# Patient Record
Sex: MALE | Race: WHITE | URBAN - METROPOLITAN AREA
[De-identification: names, ages, dates, MRNs, and addresses within clinical notes are randomized per-mention and may not be internally consistent; named-entity substitution may affect disease eponyms.]

---

## 2017-02-22 ENCOUNTER — HISTORICAL (OUTPATIENT)
Dept: LAB | Facility: HOSPITAL | Age: 72
End: 2017-02-22

## 2017-03-14 ENCOUNTER — HISTORICAL (OUTPATIENT)
Dept: CARDIOLOGY | Facility: HOSPITAL | Age: 72
End: 2017-03-14

## 2017-07-05 ENCOUNTER — HISTORICAL (OUTPATIENT)
Dept: LAB | Facility: HOSPITAL | Age: 72
End: 2017-07-05

## 2017-07-05 LAB
ABS NEUT (OLG): 3.5 X10(3)/MCL
APTT PPP: 25 SECOND(S) (ref 21–30)
BUN SERPL-MCNC: 20 MG/DL (ref 7–18)
CALCIUM SERPL-MCNC: 8.7 MG/DL (ref 8.5–10.1)
CHLORIDE SERPL-SCNC: 104 MMOL/L (ref 98–107)
CO2 SERPL-SCNC: 31 MMOL/L (ref 21–32)
CREAT SERPL-MCNC: 0.95 MG/DL (ref 0.7–1.3)
ERYTHROCYTE [DISTWIDTH] IN BLOOD BY AUTOMATED COUNT: 14 % (ref 11.5–14.8)
GLUCOSE SERPL-MCNC: 88 MG/DL (ref 74–106)
HCT VFR BLD AUTO: 42.8 % (ref 36.2–49.3)
HGB BLD-MCNC: 14.1 GM/DL (ref 12.6–17.3)
INR PPP: 1
MCH RBC QN AUTO: 30.2 PG (ref 28.5–33.8)
MCHC RBC AUTO-ENTMCNC: 32.9 % (ref 32.6–37)
MCV RBC AUTO: 91.6 FL (ref 80–99)
PLATELET # BLD AUTO: 173 X10(3)/MCL (ref 136–369)
PMV BLD AUTO: 8.7 FL (ref 7.4–10.4)
POTASSIUM SERPL-SCNC: 4 MMOL/L (ref 3.5–5.1)
PROTHROMBIN TIME: 10.8 SECOND(S) (ref 9.8–12)
RBC # BLD AUTO: 4.67 X10(6)/MCL (ref 4.19–5.75)
SODIUM SERPL-SCNC: 140 MMOL/L (ref 136–145)
WBC # SPEC AUTO: 6.6 X10(3)/MCL (ref 4–10.5)

## 2017-07-06 ENCOUNTER — HISTORICAL (OUTPATIENT)
Dept: ADMINISTRATIVE | Facility: HOSPITAL | Age: 72
End: 2017-07-06

## 2018-07-08 ENCOUNTER — HISTORICAL (OUTPATIENT)
Dept: LAB | Facility: HOSPITAL | Age: 73
End: 2018-07-08

## 2018-07-08 LAB
ABS NEUT (OLG): 2.44 X10(3)/MCL (ref 2.1–9.2)
ALBUMIN SERPL-MCNC: 3.5 GM/DL (ref 3.4–5)
ALBUMIN/GLOB SERPL: 1 {RATIO}
ALP SERPL-CCNC: 65 UNIT/L (ref 45–117)
ALT SERPL-CCNC: 22 UNIT/L (ref 16–61)
AST SERPL-CCNC: 16 UNIT/L (ref 15–37)
BASOPHILS # BLD AUTO: 0.06 X10(3)/MCL (ref 0–0.2)
BASOPHILS NFR BLD AUTO: 1.3 % (ref 0–0.9)
BILIRUB SERPL-MCNC: 0.9 MG/DL (ref 0.2–1)
BILIRUBIN DIRECT+TOT PNL SERPL-MCNC: 0.2 MG/DL (ref 0–0.2)
BILIRUBIN DIRECT+TOT PNL SERPL-MCNC: 0.7 MG/DL (ref 0–1)
BUN SERPL-MCNC: 16 MG/DL (ref 7–18)
CALCIUM SERPL-MCNC: 8.5 MG/DL (ref 8.5–10.1)
CHLORIDE SERPL-SCNC: 110 MMOL/L (ref 98–107)
CHOLEST SERPL-MCNC: 164 MG/DL (ref 0–200)
CHOLEST/HDLC SERPL: 3.2 {RATIO} (ref 0–5)
CO2 SERPL-SCNC: 27 MMOL/L (ref 21–32)
CREAT SERPL-MCNC: 1.04 MG/DL (ref 0.7–1.3)
EOSINOPHIL # BLD AUTO: 0.32 X10(3)/MCL (ref 0–0.9)
EOSINOPHIL NFR BLD AUTO: 7.1 % (ref 0–6.5)
ERYTHROCYTE [DISTWIDTH] IN BLOOD BY AUTOMATED COUNT: 14 % (ref 11.5–17)
GLOBULIN SER-MCNC: 3 GM/DL (ref 2–4)
GLUCOSE SERPL-MCNC: 101 MG/DL (ref 74–106)
HCT VFR BLD AUTO: 36.3 % (ref 42–52)
HDLC SERPL-MCNC: 51 MG/DL (ref 35–60)
HGB BLD-MCNC: 11.9 GM/DL (ref 14–18)
IMM GRANULOCYTES # BLD AUTO: 0.01 10*3/UL (ref 0–0.02)
IMM GRANULOCYTES NFR BLD AUTO: 0.2 % (ref 0–0.43)
LDLC SERPL CALC-MCNC: 89 MG/DL (ref 0–129)
LYMPHOCYTES # BLD AUTO: 1.32 X10(3)/MCL (ref 0.6–4.6)
LYMPHOCYTES NFR BLD AUTO: 29.1 % (ref 16.2–38.3)
MAGNESIUM SERPL-MCNC: 2.3 MG/DL (ref 1.8–2.4)
MCH RBC QN AUTO: 28.4 PG (ref 27–31)
MCHC RBC AUTO-ENTMCNC: 32.8 GM/DL (ref 33–36)
MCV RBC AUTO: 86.6 FL (ref 80–94)
MONOCYTES # BLD AUTO: 0.38 X10(3)/MCL (ref 0.1–1.3)
MONOCYTES NFR BLD AUTO: 8.4 % (ref 4.7–11.3)
NEUTROPHILS # BLD AUTO: 2.44 X10(3)/MCL (ref 2.1–9.2)
NEUTROPHILS NFR BLD AUTO: 53.9 % (ref 49.1–73.4)
NRBC BLD AUTO-RTO: 0 % (ref 0–0.2)
PLATELET # BLD AUTO: 137 X10(3)/MCL (ref 130–400)
PMV BLD AUTO: 9 FL (ref 7.4–10.4)
POTASSIUM SERPL-SCNC: 4.1 MMOL/L (ref 3.5–5.1)
PROT SERPL-MCNC: 6.6 GM/DL (ref 6.4–8.2)
RBC # BLD AUTO: 4.19 X10(6)/MCL (ref 4.7–6.1)
SODIUM SERPL-SCNC: 141 MMOL/L (ref 136–145)
TRIGL SERPL-MCNC: 119 MG/DL (ref 30–150)
VLDLC SERPL CALC-MCNC: 24 MG/DL
WBC # SPEC AUTO: 4.5 X10(3)/MCL (ref 4.5–11.5)

## 2019-02-20 ENCOUNTER — HISTORICAL (OUTPATIENT)
Dept: LAB | Facility: HOSPITAL | Age: 74
End: 2019-02-20

## 2019-02-20 LAB
ABS NEUT (OLG): 2.29 X10(3)/MCL (ref 2.1–9.2)
BASOPHILS # BLD AUTO: 0.05 X10(3)/MCL (ref 0–0.2)
BASOPHILS NFR BLD AUTO: 1.1 % (ref 0–0.9)
BUN SERPL-MCNC: 13 MG/DL (ref 7–18)
CALCIUM SERPL-MCNC: 8.8 MG/DL (ref 8.5–10.1)
CHLORIDE SERPL-SCNC: 106 MMOL/L (ref 98–107)
CO2 SERPL-SCNC: 27 MMOL/L (ref 21–32)
CREAT SERPL-MCNC: 1.13 MG/DL (ref 0.7–1.3)
CREAT/UREA NIT SERPL: 12
EOSINOPHIL # BLD AUTO: 0.32 X10(3)/MCL (ref 0–0.9)
EOSINOPHIL NFR BLD AUTO: 7.1 % (ref 0–6.5)
ERYTHROCYTE [DISTWIDTH] IN BLOOD BY AUTOMATED COUNT: 14.4 % (ref 11.5–17)
GLUCOSE SERPL-MCNC: 89 MG/DL (ref 74–106)
HCT VFR BLD AUTO: 38.2 % (ref 42–52)
HGB BLD-MCNC: 12.3 GM/DL (ref 14–18)
IMM GRANULOCYTES # BLD AUTO: 0.01 10*3/UL (ref 0–0.02)
IMM GRANULOCYTES NFR BLD AUTO: 0.2 % (ref 0–0.43)
LYMPHOCYTES # BLD AUTO: 1.3 X10(3)/MCL (ref 0.6–4.6)
LYMPHOCYTES NFR BLD AUTO: 29 % (ref 16.2–38.3)
MAGNESIUM SERPL-MCNC: 2.2 MG/DL (ref 1.8–2.4)
MCH RBC QN AUTO: 28.1 PG (ref 27–31)
MCHC RBC AUTO-ENTMCNC: 32.2 GM/DL (ref 33–36)
MCV RBC AUTO: 87.4 FL (ref 80–94)
MONOCYTES # BLD AUTO: 0.51 X10(3)/MCL (ref 0.1–1.3)
MONOCYTES NFR BLD AUTO: 11.4 % (ref 4.7–11.3)
NEUTROPHILS # BLD AUTO: 2.29 X10(3)/MCL (ref 2.1–9.2)
NEUTROPHILS NFR BLD AUTO: 51.2 % (ref 49.1–73.4)
NRBC BLD AUTO-RTO: 0 % (ref 0–0.2)
PLATELET # BLD AUTO: 134 X10(3)/MCL (ref 130–400)
PMV BLD AUTO: 8.8 FL (ref 7.4–10.4)
POTASSIUM SERPL-SCNC: 4 MMOL/L (ref 3.5–5.1)
RBC # BLD AUTO: 4.37 X10(6)/MCL (ref 4.7–6.1)
SODIUM SERPL-SCNC: 138 MMOL/L (ref 136–145)
WBC # SPEC AUTO: 4.5 X10(3)/MCL (ref 4.5–11.5)

## 2020-03-10 ENCOUNTER — HISTORICAL (OUTPATIENT)
Dept: LAB | Facility: HOSPITAL | Age: 75
End: 2020-03-10

## 2020-03-10 LAB
ABS NEUT (OLG): 3.25 X10(3)/MCL (ref 2.1–9.2)
ALBUMIN SERPL-MCNC: 3.8 GM/DL (ref 3.4–5)
ALBUMIN/GLOB SERPL: 1.3 RATIO (ref 1–2)
ALP SERPL-CCNC: 67 UNIT/L (ref 45–117)
ALT SERPL-CCNC: 18 UNIT/L (ref 16–61)
AST SERPL-CCNC: 45 UNIT/L (ref 15–37)
BASOPHILS # BLD AUTO: 0.03 X10(3)/MCL (ref 0–0.2)
BASOPHILS NFR BLD AUTO: 0.5 % (ref 0–0.9)
BILIRUB SERPL-MCNC: 1.3 MG/DL (ref 0.2–1)
BILIRUBIN DIRECT+TOT PNL SERPL-MCNC: 0.35 MG/DL (ref 0–0.2)
BILIRUBIN DIRECT+TOT PNL SERPL-MCNC: 0.95 MG/DL (ref 0–1)
BUN SERPL-MCNC: 15 MG/DL (ref 7–18)
CALCIUM SERPL-MCNC: 8.9 MG/DL (ref 8.5–10.1)
CHLORIDE SERPL-SCNC: 105 MMOL/L (ref 98–107)
CHOLEST SERPL-MCNC: 133 MG/DL (ref 0–199)
CHOLEST/HDLC SERPL: 2 {RATIO}
CO2 SERPL-SCNC: 30 MMOL/L (ref 21–32)
CREAT SERPL-MCNC: 1.15 MG/DL (ref 0.7–1.3)
EOSINOPHIL # BLD AUTO: 0.3 X10(3)/MCL (ref 0–0.9)
EOSINOPHIL NFR BLD AUTO: 5.3 % (ref 0–6.5)
ERYTHROCYTE [DISTWIDTH] IN BLOOD BY AUTOMATED COUNT: 13.9 % (ref 11.5–17)
GLOBULIN SER-MCNC: 3 GM/DL (ref 2–4)
GLUCOSE SERPL-MCNC: 86 MG/DL (ref 74–106)
HCT VFR BLD AUTO: 37.2 % (ref 42–52)
HDLC SERPL-MCNC: 61 MG/DL
HGB BLD-MCNC: 12 GM/DL (ref 14–18)
IMM GRANULOCYTES # BLD AUTO: 0.01 10*3/UL (ref 0–0.02)
IMM GRANULOCYTES NFR BLD AUTO: 0.2 % (ref 0–0.43)
LDLC SERPL CALC-MCNC: 52 MG/DL (ref 0–129)
LYMPHOCYTES # BLD AUTO: 1.4 X10(3)/MCL (ref 0.6–4.6)
LYMPHOCYTES NFR BLD AUTO: 24.8 % (ref 16.2–38.3)
MAGNESIUM SERPL-MCNC: 2.2 MG/DL (ref 1.8–2.4)
MCH RBC QN AUTO: 29.4 PG (ref 27–31)
MCHC RBC AUTO-ENTMCNC: 32.3 GM/DL (ref 33–36)
MCV RBC AUTO: 91.2 FL (ref 80–94)
MONOCYTES # BLD AUTO: 0.65 X10(3)/MCL (ref 0.1–1.3)
MONOCYTES NFR BLD AUTO: 11.5 % (ref 4.7–11.3)
NEUTROPHILS # BLD AUTO: 3.25 X10(3)/MCL (ref 2.1–9.2)
NEUTROPHILS NFR BLD AUTO: 57.7 % (ref 49.1–73.4)
NRBC BLD AUTO-RTO: 0 % (ref 0–0.2)
PLATELET # BLD AUTO: 140 X10(3)/MCL (ref 130–400)
PMV BLD AUTO: 9.4 FL (ref 7.4–10.4)
POTASSIUM SERPL-SCNC: 4.3 MMOL/L (ref 3.5–5.1)
PROT SERPL-MCNC: 6.5 GM/DL (ref 6.4–8.2)
RBC # BLD AUTO: 4.08 X10(6)/MCL (ref 4.7–6.1)
SODIUM SERPL-SCNC: 140 MMOL/L (ref 136–145)
TRIGL SERPL-MCNC: 101 MG/DL (ref 0–149)
VLDLC SERPL CALC-MCNC: 20 MG/DL
WBC # SPEC AUTO: 5.6 X10(3)/MCL (ref 4.5–11.5)

## 2020-06-23 ENCOUNTER — HISTORICAL (OUTPATIENT)
Dept: LAB | Facility: HOSPITAL | Age: 75
End: 2020-06-23

## 2020-06-23 LAB
ABS NEUT (OLG): 3.38 X10(3)/MCL (ref 2.1–9.2)
BASOPHILS # BLD AUTO: 0.05 X10(3)/MCL (ref 0–0.2)
BASOPHILS NFR BLD AUTO: 0.8 % (ref 0–0.9)
EOSINOPHIL # BLD AUTO: 0.5 X10(3)/MCL (ref 0–0.9)
EOSINOPHIL NFR BLD AUTO: 8.1 % (ref 0–6.5)
ERYTHROCYTE [DISTWIDTH] IN BLOOD BY AUTOMATED COUNT: 15.1 % (ref 11.5–17)
HCT VFR BLD AUTO: 36.8 % (ref 42–52)
HGB BLD-MCNC: 11.5 GM/DL (ref 14–18)
IMM GRANULOCYTES # BLD AUTO: 0.01 10*3/UL (ref 0–0.02)
IMM GRANULOCYTES NFR BLD AUTO: 0.2 % (ref 0–0.43)
LYMPHOCYTES # BLD AUTO: 1.54 X10(3)/MCL (ref 0.6–4.6)
LYMPHOCYTES NFR BLD AUTO: 25 % (ref 16.2–38.3)
MCH RBC QN AUTO: 27 PG (ref 27–31)
MCHC RBC AUTO-ENTMCNC: 31.3 GM/DL (ref 33–36)
MCV RBC AUTO: 86.4 FL (ref 80–94)
MONOCYTES # BLD AUTO: 0.67 X10(3)/MCL (ref 0.1–1.3)
MONOCYTES NFR BLD AUTO: 10.9 % (ref 4.7–11.3)
NEUTROPHILS # BLD AUTO: 3.38 X10(3)/MCL (ref 2.1–9.2)
NEUTROPHILS NFR BLD AUTO: 55 % (ref 49.1–73.4)
NRBC BLD AUTO-RTO: 0 % (ref 0–0.2)
PLATELET # BLD AUTO: 147 X10(3)/MCL (ref 130–400)
PMV BLD AUTO: 8.7 FL (ref 7.4–10.4)
RBC # BLD AUTO: 4.26 X10(6)/MCL (ref 4.7–6.1)
WBC # SPEC AUTO: 6.2 X10(3)/MCL (ref 4.5–11.5)

## 2020-11-12 ENCOUNTER — HISTORICAL (OUTPATIENT)
Dept: ADMINISTRATIVE | Facility: HOSPITAL | Age: 75
End: 2020-11-12

## 2020-11-16 ENCOUNTER — HISTORICAL (OUTPATIENT)
Dept: LAB | Facility: HOSPITAL | Age: 75
End: 2020-11-16

## 2020-11-16 LAB
ABS NEUT (OLG): 3.71 X10(3)/MCL (ref 2.1–9.2)
BASOPHILS # BLD AUTO: 0.04 X10(3)/MCL (ref 0–0.2)
BASOPHILS NFR BLD AUTO: 0.7 % (ref 0–0.9)
BUN SERPL-MCNC: 17 MG/DL (ref 8.4–25.7)
CALCIUM SERPL-MCNC: 9.3 MG/DL (ref 8.8–10)
CHLORIDE SERPL-SCNC: 102 MMOL/L (ref 98–107)
CO2 SERPL-SCNC: 26 MMOL/L (ref 23–31)
CREAT SERPL-MCNC: 1.12 MG/DL (ref 0.72–1.25)
CREAT/UREA NIT SERPL: 15
EOSINOPHIL # BLD AUTO: 0.34 X10(3)/MCL (ref 0–0.9)
EOSINOPHIL NFR BLD AUTO: 5.9 % (ref 0–6.5)
ERYTHROCYTE [DISTWIDTH] IN BLOOD BY AUTOMATED COUNT: 13.1 % (ref 11.5–17)
GLUCOSE SERPL-MCNC: 99 MG/DL (ref 82–115)
HCT VFR BLD AUTO: 38.8 % (ref 42–52)
HGB BLD-MCNC: 13 GM/DL (ref 14–18)
IMM GRANULOCYTES # BLD AUTO: 0.02 10*3/UL (ref 0–0.02)
IMM GRANULOCYTES NFR BLD AUTO: 0.3 % (ref 0–0.43)
LYMPHOCYTES # BLD AUTO: 1.18 X10(3)/MCL (ref 0.6–4.6)
LYMPHOCYTES NFR BLD AUTO: 20.4 % (ref 16.2–38.3)
MAGNESIUM SERPL-MCNC: 2.37 MG/DL (ref 1.6–2.6)
MCH RBC QN AUTO: 30.3 PG (ref 27–31)
MCHC RBC AUTO-ENTMCNC: 33.5 GM/DL (ref 33–36)
MCV RBC AUTO: 90.4 FL (ref 80–94)
MONOCYTES # BLD AUTO: 0.49 X10(3)/MCL (ref 0.1–1.3)
MONOCYTES NFR BLD AUTO: 8.5 % (ref 4.7–11.3)
NEUTROPHILS # BLD AUTO: 3.71 X10(3)/MCL (ref 2.1–9.2)
NEUTROPHILS NFR BLD AUTO: 64.2 % (ref 49.1–73.4)
NRBC BLD AUTO-RTO: 0 % (ref 0–0.2)
PLATELET # BLD AUTO: 194 X10(3)/MCL (ref 130–400)
PMV BLD AUTO: 8.9 FL (ref 7.4–10.4)
POTASSIUM SERPL-SCNC: 4.1 MMOL/L (ref 3.5–5.1)
RBC # BLD AUTO: 4.29 X10(6)/MCL (ref 4.7–6.1)
SODIUM SERPL-SCNC: 136 MMOL/L (ref 136–145)
WBC # SPEC AUTO: 5.8 X10(3)/MCL (ref 4.5–11.5)

## 2020-11-18 ENCOUNTER — HISTORICAL (OUTPATIENT)
Dept: SURGERY | Facility: HOSPITAL | Age: 75
End: 2020-11-18

## 2020-12-03 ENCOUNTER — HISTORICAL (OUTPATIENT)
Dept: ADMINISTRATIVE | Facility: HOSPITAL | Age: 75
End: 2020-12-03

## 2021-01-12 ENCOUNTER — HISTORICAL (OUTPATIENT)
Dept: ADMINISTRATIVE | Facility: HOSPITAL | Age: 76
End: 2021-01-12

## 2021-03-11 ENCOUNTER — HISTORICAL (OUTPATIENT)
Dept: ADMINISTRATIVE | Facility: HOSPITAL | Age: 76
End: 2021-03-11

## 2021-10-20 ENCOUNTER — HISTORICAL (OUTPATIENT)
Dept: LAB | Facility: HOSPITAL | Age: 76
End: 2021-10-20

## 2021-10-20 LAB
ABS NEUT (OLG): 5.46 X10(3)/MCL (ref 2.1–9.2)
ALBUMIN SERPL-MCNC: 3.9 GM/DL (ref 3.4–4.8)
ALBUMIN/GLOB SERPL: 1.4 RATIO (ref 1.1–2)
ALP SERPL-CCNC: 66 UNIT/L (ref 40–150)
ALT SERPL-CCNC: 32 UNIT/L (ref 0–55)
AST SERPL-CCNC: 21 UNIT/L (ref 5–34)
BASOPHILS # BLD AUTO: 0.03 X10(3)/MCL (ref 0–0.2)
BASOPHILS NFR BLD AUTO: 0.4 % (ref 0–0.9)
BILIRUB SERPL-MCNC: 1.5 MG/DL (ref 0.2–1.2)
BILIRUBIN DIRECT+TOT PNL SERPL-MCNC: 0.5 MG/DL (ref 0–0.5)
BILIRUBIN DIRECT+TOT PNL SERPL-MCNC: 1 MG/DL (ref 0–0.8)
BUN SERPL-MCNC: 15.9 MG/DL (ref 8.4–25.7)
CALCIUM SERPL-MCNC: 9.4 MG/DL (ref 8.8–10)
CHLORIDE SERPL-SCNC: 106 MMOL/L (ref 98–107)
CO2 SERPL-SCNC: 27 MMOL/L (ref 23–31)
CREAT SERPL-MCNC: 1.19 MG/DL (ref 0.72–1.25)
EOSINOPHIL # BLD AUTO: 0.15 X10(3)/MCL (ref 0–0.9)
EOSINOPHIL NFR BLD AUTO: 1.8 % (ref 0–6.5)
ERYTHROCYTE [DISTWIDTH] IN BLOOD BY AUTOMATED COUNT: 13.2 % (ref 11.5–17)
GLOBULIN SER-MCNC: 2.7 GM/DL (ref 2.4–3.5)
GLUCOSE SERPL-MCNC: 94 MG/DL (ref 82–115)
HCT VFR BLD AUTO: 41 % (ref 42–52)
HGB BLD-MCNC: 13.3 GM/DL (ref 14–18)
IMM GRANULOCYTES # BLD AUTO: 0.03 10*3/UL (ref 0–0.02)
IMM GRANULOCYTES NFR BLD AUTO: 0.4 % (ref 0–0.43)
LYMPHOCYTES # BLD AUTO: 1.66 X10(3)/MCL (ref 0.6–4.6)
LYMPHOCYTES NFR BLD AUTO: 20.2 % (ref 16.2–38.3)
MAGNESIUM SERPL-MCNC: 2.5 MG/DL (ref 1.6–2.6)
MCH RBC QN AUTO: 29.4 PG (ref 27–31)
MCHC RBC AUTO-ENTMCNC: 32.4 GM/DL (ref 33–36)
MCV RBC AUTO: 90.7 FL (ref 80–94)
MONOCYTES # BLD AUTO: 0.88 X10(3)/MCL (ref 0.1–1.3)
MONOCYTES NFR BLD AUTO: 10.7 % (ref 4.7–11.3)
NEUTROPHILS # BLD AUTO: 5.46 X10(3)/MCL (ref 2.1–9.2)
NEUTROPHILS NFR BLD AUTO: 66.5 % (ref 49.1–73.4)
NRBC BLD AUTO-RTO: 0 % (ref 0–0.2)
PLATELET # BLD AUTO: 171 X10(3)/MCL (ref 130–400)
PMV BLD AUTO: 8.7 FL (ref 7.4–10.4)
POTASSIUM SERPL-SCNC: 4.1 MMOL/L (ref 3.5–5.1)
PROT SERPL-MCNC: 6.6 GM/DL (ref 5.8–7.6)
RBC # BLD AUTO: 4.52 X10(6)/MCL (ref 4.7–6.1)
SODIUM SERPL-SCNC: 141 MMOL/L (ref 136–145)
TESTOST SERPL-MCNC: 436.6 NG/DL (ref 220.91–715.81)
WBC # SPEC AUTO: 8.2 X10(3)/MCL (ref 4.5–11.5)

## 2022-04-10 ENCOUNTER — HISTORICAL (OUTPATIENT)
Dept: ADMINISTRATIVE | Facility: HOSPITAL | Age: 77
End: 2022-04-10

## 2022-04-28 VITALS
BODY MASS INDEX: 23.54 KG/M2 | WEIGHT: 141.31 LBS | HEIGHT: 65 IN | DIASTOLIC BLOOD PRESSURE: 70 MMHG | SYSTOLIC BLOOD PRESSURE: 130 MMHG

## 2022-04-30 NOTE — OP NOTE
DATE OF SURGERY:    07/06/2017    SURGEON:  Kameron Herman MD    INDICATION:  Abnormal stress test.    POSTOPERATIVE DIAGNOSIS:  Nonobstructive coronary artery disease.    PROCEDURES:    1. Right radial angiography.   2. Selective heart cath with left and right coronary angiography.  3. Left ventriculography with hemodynamics.   4. TR band utilizing hemostasis of right radial arteriotomy.    ANGIOGRAPHIC FINDINGS:    1. Right radial demonstrated spasm into distal tip of the sheath.  This resolved with administration of intraarterial Cardene and nitroglycerin.    2. LV - EF 65%.  EDP is 13.  No significant gradient on pullback.   3. Left main is normal.   4. Left circumflex is nondominant with 20% proximal lesion and 20% proximal OM1 lesion.  Remainder of the vessel has luminal irregularities.   5. LAD has 30 to 40% proximal to mid lesion.  The remainder of the vessel has luminal irregularities.   6. RCA - dominant vessel with luminal irregularities.    ESTIMATED BLOOD LOSS:  15 ml.    TISSUE REMOVED:  None applicable.    COMPLICATIONS:  None.    CONSENT:  Informed consent was obtained for the procedure.  Prior to bringing the patient to the Cath Lab, risks, benefits and alternatives were explained in detail including but not limited to stroke, heart attack, death, need for emergent surgery, kidney failure from contrast exposure and bleeding.  The patient voiced understanding and wished to proceed.    HISTORY OF PRESENT ILLNESS:  Please see my H&P for details.    PROCEDURE IN DETAIL:  The patient was taken to the Cardiac Catheterization Laboratory in fasting state and prepped and draped in the usual sterile fashion.  1% lidocaine was used to anesthetize the cutaneous layers of the right radial artery.  #6 Wolof Terumo slender sheath was inserted using modified Seldinger technique.  Secondary to difficulty manipulating the wire, it was decided to proceed with radial angiography, which demonstrated spasm  near distal tip of the sheath.  Intraarterial Cardene and nitroglycerin was administered.  A Glidewire was then advanced into the left ventricular chamber.  Tiger catheter was then tracked into left ventricular chamber.    HEMODYNAMICS:  LV gram pullback gradient was performed.  Heparin was utilized for systemic anticoagulation.  Catheter was redirected in left main and several angiographic views were obtained.  Catheter was redirected in the right coronary artery and several angiographic views were obtained.  The catheter was then removed and a TR band was utilized for hemostasis of right radial arteriotomy.    PLAN:    1. Will continue medical management of the patient's nonobstructive coronary disease.   2. Will discharge home later today.        ______________________________  MD NITHIN Starks/CHANNING  DD:  07/06/2017  Time:  11:31AM  DT:  07/06/2017  Time:  02:29PM  Job #:  [id number]

## 2022-04-30 NOTE — OP NOTE
DATE OF SURGERY:    11/18/2020    SURGEON:  Miguel Zimmer MD  ASSISTANT:  MARIANNA Trinidad    PREOPERATIVE DIAGNOSIS:  Right distal radius fracture.    POSTOPERATIVE DIAGNOSIS:  Right distal radius fracture.    PROCEDURE PERFORMED:  Open reduction and internal fixation of right radius fracture.    ASSISTANT ATTESTATION:  A skilled set of hands was necessary for proper patient positioning, as well as assistance with closure and holding multiple retractors.    IMPLANTS:  Include Biomet right distal radius DVR Crosslock plate with 2.7 cortical screws x4 and 2.7 locking screws x6.    INDICATIONS FOR SURGERY:  The patient is a 75-year-old male who presented to my office.  The patient had fallen and had sustained a right distal radius fracture.  I discussed all treatment options with the patient.  Given the displaced nature of the fracture, we discussed operative and nonoperative interventions.  The risks, benefits, and alternatives to operative intervention were discussed in detail with the patient.  All questions were answered.  No guarantees were made.  Despite these risks, he elected to proceed with surgical intervention.    PROCEDURE IN DETAIL:  The patient was seen preoperatively in the preoperative holding area.  He was marked and consented for surgery.  He was taken to the operating room, where he was placed under general endotracheal anesthesia.  The right wrist was prepped and draped in normal sterile fashion.  A time-out was performed verifying the correct patient, site, side, and procedure, and all were in agreement.  A standard volar approach to the wrist was performed centered on the FCR tendon.  The skin incision was taken directly overlying the FCR tendon sheath.  The FCR tendon sheath was incised, and the tendon was taken ulnarly.  We went down through the inferior profundus tendon, split this, divided this bluntly down to the pronator quadratus.  We made a hockey-stick style incision  along the pronator quadratus as well, elevating this off the bone.  We evaluated the fracture.  We had to stick an elevator in the fracture to get his dorsal tilt back.  Once we were able to get this opened up to get the fracture reduced, we pinned it in position using a K-wire.  We confirmed reduction in AP and lateral views.  We then placed a standard DVR Crosslock plate into position, placed a screw in the distal oblong hole, adjusted it for rotation, adjusted it for appropriate length.  We placed a K-wire in the proximal ulnar wire set, evaluated it, and noted that it was extra-articular.  We placed 1 screw to suck the plate down and then 2 more screws in the proximal row, both locking with appropriate length.  We then tightened the screw in the shaft, again restoring volar tilt.  We then placed the remaining screws in the distal row followed by a screw in the radial styloid.  All screws were appropriate length and on x-ray appeared to be extra-articular in nature.  We then placed 2 more screws in the shaft, again appropriate length.  We then confirmed appropriate position of the plate as well as adequate reduction.  We then dropped the tourniquet and coagulated all bleeders.  We closed the fascia with 2-0 Vicryl.  We used 2-0 Vicryl on subcutaneous tissue and 3-0 nylon on the skin.  We placed the patient in a distal radius volar splint.  He was aroused from anesthesia without any issue and was transferred to recovery in stable condition.  Postoperatively, he will be nonweightbearing and will be discharged home today with followup with me in 2 weeks.        ______________________________  MD TANNER Hu/SK  DD:  11/18/2020  Time:  03:57PM  DT:  11/18/2020  Time:  04:33PM  Job #:  [id number]

## 2022-05-03 NOTE — HISTORICAL [facility] CERNER
This is a historical note converted from Adam. Formatting and pictures may have been removed.  Please reference Adam for original formatting and attached multimedia. Chief Complaint  RIGHT ULNER STYLOID FRACTURE. HE STATES HE FELL AS HE WAS WALKING UP SOME CEMENT STEPS AND FELL BACKWARDS ON 11/05/20. HE WENT TO [facility] ER THEY TOOK SOME X-RASY AND PUT HIM IN A SPLINT. ?HE STATES THE PAIN IS GETTING BETTER.  History of Present Illness  35-year male presents for evaluation of right wrist injury. ?Patient?fell at home. ?Landed on outstretched arm. ?Seen in the ER underwent close reduction of his right distal radius fracture.? He is here today for follow-up of this. ?Complains of isolated points of pain to the right wrist. 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 Complaining of pain to his right wrist but otherwise without complaints.  Review of Systems  Denies fevers, chills, chest pain, shortness of breath. Comprehensive review of systems performed and otherwise negative except as noted in HPI.  Physical Exam  Vitals & Measurements  T:?97.6? ?F (Oral)? BP:?138/72?  HT:?165.00?cm? WT:?64.410?kg? BMI:?23.66?  General: No acute distress, alert and oriented, healthy appearing?  HEENT: Head is atraumatic, mucous membranes are moist?  Cardiovascular: Brisk capillary refill  Lungs: Breathing non-labored?  Skin: no rashes appreciated?  Neurologic: Sensation is grossly intact distally  ?  Right hand:  Patient brisk cap refill distally. ?Does have some swelling and ecchymosis to his hand.? Positive FPL EPL?and hand intrinsics today although he does have weakness to this area which is chronic in nature. ?Sensation is intact although?decreased on contralateral side again chronic in nature.  Assessment/Plan  1.?Fracture of distal end of right radius and ulna?S52.501A  ?Patient radius radius fracture with significant dorsal displacement peer  discussed all treatment options with patient. ?This point given his significant displacement, despite his?prior nerve injury, would recommend operative excision with ORIF of his right distal radius?to stabilize this and get him out of the splint. ?The risk and benefits, turns to ORIF open reduction fixation of his right wrist discussed in detail. ?All questions answered the patient satisfaction. ?No guarantees made.  Orders:  XR Wrist Right Minimum 3 Views, Routine, 11/12/20 9:33:00 CST, None, Ambulatory, Patient Has IV?, Rad Type, Fracture of right ulnar styloid, Not Scheduled, 11/12/20 9:33:00 CST   Problem List/Past Medical History  Ongoing  Tobacco user  Historical  CAD - Coronary artery disease  COPD (chronic obstructive pulmonary disease)  Depressed  Hyperlipemia  Parkinson disease  Spinal stenosis  Procedure/Surgical History  Application of short arm splint (forearm to hand); static (11/05/2020)  Immobilization of Right Lower Arm using Splint (11/05/2020)  Repair Face Skin, External Approach (11/05/2020)  Simple repair of superficial wounds of face, ears, eyelids, nose, lips and/or mucous membranes; 2.5 cm or less (11/05/2020)  Catheter placement in coronary artery(s) for coronary angiography, including intraprocedural injection(s) for coronary angiography, imaging supervision and interpretation; with left heart catheterization including intraprocedural injection(s) for left lilli (07/06/2017)  Fluoroscopy of Left Heart using Low Osmolar Contrast (07/06/2017)  Fluoroscopy of Multiple Coronary Arteries using Low Osmolar Contrast (07/06/2017)  Measurement of Cardiac Sampling and Pressure, Left Heart, Percutaneous Approach (07/06/2017)  neck surgery  Repair of rotator cuff of shoulder  Tonsillectomy   Medications  Antioxidant Formula oral capsule, 1 cap(s), Oral, Daily  aspirin 81 mg oral tablet, 81 mg= 1 tab(s), Oral, Daily  carbidopa-levodopa 25 mg-100 mg oral tablet, See Instructions  Combivent Respimat [facility]  free 20 mcg-100 mcg/inh inhalation aerosol, 1 puff(s), INH, QID  ESCITALOPRAM 20 MG TABLET, 20 mg= 1 tab(s), Oral, Daily  FLUTICASONE PROP 50 MCG SPRAY, 2 spray(s), Nasal, Daily  Imdur 30 mg oral tablet, extended release, 30 mg= 1 tab(s), Oral, Once  LEVALBUTEROL 1.25 MG/3 ML SOL, 1.25 mg= 3 mL, Nasal, q6hr  MELOXICAM 15 MG TABLET, 15 mg= 1 tab(s), Oral, Daily  multivitamin with minerals (Adult Tab), 1 tab(s), Oral, Daily  Prilosec 40 mg oral DR capsule, 40 mg= 1 cap(s), Oral, Daily  propranolol 10 mg oral tablet, 10 mg= 1 tab(s), Oral, Daily  Proscar 5 mg oral tablet, 5 mg= 1 tab(s), Oral, Daily  Symbicort 160 mcg-4.5 mcg/inh inhalation aerosol, 2 puff(s), INH, BID  tamsulosin 0.4 mg oral capsule, 0.4 mg= 1 cap(s), Oral, Daily  tetanus/diphth/pertuss (Tdap) adult/adol, 0.5 mL, IM, Once  umeclidinium 62.5 mcg/inh inhalation powder, 1 EA, INH, q24hr  Vitamin D3 2000 intl units oral tablet, 4000 IntUnit= 2 tab(s), Oral, Daily  Zocor 40 mg oral tablet, 40 mg= 1 tab(s), Oral, Once a day (at bedtime)  Allergies  No Known Allergies  Social History  Abuse/Neglect  No, No, Yes, 11/12/2020  Alcohol  Past, 01/18/2017  Employment/School  Retired, 07/05/2017  Home/Environment  Lives with Children., 07/05/2017  Nutrition/Health  Regular, 07/05/2017  Substance Use  Never, 01/18/2017  Tobacco  10 or more cigarettes (1/2 pack or more)/day in last 30 days, Yes, Household tobacco concerns: No., 11/12/2020  Family History  COPD (chronic obstructive pulmonary disease).: Mother.  Health Maintenance  Health Maintenance  ???Pending?(in the next year)  ??? ??OverDue  ??? ? ? ?Aspirin Therapy for CVD Prevention due??04/04/18??and every 1??year(s)  ??? ? ? ?Advance Directive due??01/02/20??and every 1??year(s)  ??? ? ? ?Cognitive Screening due??01/02/20??and every 1??year(s)  ??? ??Due?  ??? ? ? ?Influenza Vaccine due??10/01/20??and every 1??day(s)  ??? ? ? ?ADL Screening due??11/12/20??and every 1??year(s)  ??? ? ? ?Medicare Annual Wellness  Exam due??11/12/20??and every 1??year(s)  ??? ? ? ?Pneumococcal Vaccine due??11/12/20??Unknown Frequency  ??? ? ? ?Tetanus Vaccine due??11/12/20??and every 10??year(s)  ??? ? ? ?Zoster Vaccine due??11/12/20??Unknown Frequency  ??? ??Due In Future?  ??? ? ? ?Obesity Screening not due until??01/01/21??and every 1??year(s)  ??? ? ? ?Smoking Cessation not due until??01/01/21??and every 1??year(s)  ??? ? ? ?Fall Risk Assessment not due until??01/02/21??and every 1??year(s)  ??? ? ? ?Functional Assessment not due until??01/02/21??and every 1??year(s)  ??? ? ? ?Hypertension Management-BMP not due until??11/05/21??and every 1??year(s)  ???Satisfied?(in the past 1 year)  ??? ??Satisfied?  ??? ? ? ?Blood Pressure Screening on??11/12/20.??Satisfied by Shay Miller  ??? ? ? ?Body Mass Index Check on??11/12/20.??Satisfied by Shay Miller  ??? ? ? ?Depression Screening on??11/12/20.??Satisfied by Shay Miller  ??? ? ? ?Diabetes Screening on??11/05/20.??Satisfied by Ana Silverio  ??? ? ? ?Fall Risk Assessment on??11/12/20.??Satisfied by Shay Miller  ??? ? ? ?Functional Assessment on??11/12/20.??Satisfied by Shay Miller  ??? ? ? ?Hypertension Management-Blood Pressure on??11/12/20.??Satisfied by Shay Miller  ??? ? ? ?Lipid Screening on??03/10/20.??Satisfied by Leslie Cm  ??? ? ? ?Obesity Screening on??11/12/20.??Satisfied by Shay Miller  ??? ? ? ?Smoking Cessation on??11/12/20.??Satisfied by Shay Miller  ?  Diagnostic Results  AP lateral right wrist reviewed. ?Patient with displaced distal radius fracture?with significant mild dorsal displacement.

## 2022-05-03 NOTE — HISTORICAL [facility] CERNER
This is a historical note converted from Adam. Formatting and pictures may have been removed.  Please reference Adam for original formatting and attached multimedia. Chief Complaint  ORIF RIGHT WRIST ?ON 11/18/20.  History of Present Illness  75-year-old man presents today status post operation fixation of his right distal radius fracture.? Patient feels much improved after his fixation of his distal radius. ?He is happy his recovery thus far.  Review of Systems  Denies fevers, chills, chest pain, shortness of breath. Comprehensive review of systems performed and otherwise negative except as noted in HPI.  Physical Exam  Vitals & Measurements  T:?97.9? ?F (Oral)? BP:?110/71?  HT:?165.10?cm? WT:?64.100?kg? BMI:?23.52?  General: No acute distress, alert and oriented, healthy appearing?  HEENT: Head is atraumatic, mucous membranes are moist?  Cardiovascular: Brisk capillary refill  Lungs: Breathing non-labored?  Skin: no rashes appreciated?  ?  Right wrist:  Incision clean and dry. ?Brisk cap refill distally. ?Range of motion of his fingers is full. ?Good range of motion of his wrist without pain.  Assessment/Plan  1.?Fracture of distal end of right radius and ulna?S52.501A  ?Fracture line and unchanged. ?Has a very early callus. ?We will put him in a brace. ?No weightbearing. ?Gentle range of motion as tolerated. ?We will see him back in a month with x-rays.  Ordered:  Clinic Follow up, *Est. 01/05/21 13:00:00 CST, Order for future visit, Fracture of distal end of right radius and ulna, [facility]  [employer] [facility] [employer], 12/03/20 12:17:00 CST, FRACTURE WRIST SPLINT, Fracture of distal end of right radius and ulna  Post-Op follow-up visit [id number] PC, Fracture of distal end of right radius and ulna, [facility], 12/03/20 12:13:00 CST  XR Wrist Right Minimum 3 Views, Routine, *Est. 12/31/20 3:00:00 CST, None, Ambulatory, Patient Has IV?, Rad Type, Order for future visit, Fracture of distal end of  right radius and ulna, Not Scheduled, *Est. 12/31/20 3:00:00 CST  ?  Referrals  Clinic Follow up, *Est. 01/05/21 13:00:00 CST, Order for future visit, Fracture of distal end of right radius and ulna, [facility]   Problem List/Past Medical History  Ongoing  CAD - Coronary artery disease  COPD - Chronic obstructive pulmonary disease  Emphysema of lung  Fracture of distal end of right radius and ulna  GERD - Gastro-esophageal reflux disease  High cholesterol  Hypertension  Irregular heart beat  Neuropathy  Parkinson disease  Tobacco user  Historical  COPD (chronic obstructive pulmonary disease)  Depressed  Hyperlipemia  Parkinson disease  Spinal stenosis  Procedure/Surgical History  Open treatment of distal radial extra-articular fracture or epiphyseal separation, with internal fixation (11/18/2020)  ORIF Forearm (Right) (11/18/2020)  Reposition Right Radius with Internal Fixation Device, Open Approach (11/18/2020)  Application of short arm splint (forearm to hand); static (11/05/2020)  Immobilization of Right Lower Arm using Splint (11/05/2020)  Repair Face Skin, External Approach (11/05/2020)  Simple repair of superficial wounds of face, ears, eyelids, nose, lips and/or mucous membranes; 2.5 cm or less (11/05/2020)  Catheter placement in coronary artery(s) for coronary angiography, including intraprocedural injection(s) for coronary angiography, imaging supervision and interpretation; with left heart catheterization including intraprocedural injection(s) for left lilli (07/06/2017)  Fluoroscopy of Left Heart using Low Osmolar Contrast (07/06/2017)  Fluoroscopy of Multiple Coronary Arteries using Low Osmolar Contrast (07/06/2017)  Measurement of Cardiac Sampling and Pressure, Left Heart, Percutaneous Approach (07/06/2017)  neck surgery  removal cyst in jaw  Repair of rotator cuff of shoulder  Tonsillectomy   Medications  acetaminophen-hydrocodone 325 mg-5 mg oral tablet, 1 tab(s), Oral, q4hr  alPRAzolam 0.25 mg oral  tab, 0.25 mg= 1 tab(s), Oral, BID  Antioxidant Formula oral capsule, 1 cap(s), Oral, Daily,? ?Not taking: not on list  aspirin 81 mg oral tablet, 81 mg= 1 tab(s), Oral, Daily  carbidopa-levodopa 25 mg-100 mg oral tablet, See Instructions  Combivent Respimat [facility] free 20 mcg-100 mcg/inh inhalation aerosol, 1 puff(s), INH, QID  ESCITALOPRAM 20 MG TABLET, 20 mg= 1 tab(s), Oral, Daily  ezetimibe 10 mg oral tablet, 10 mg= 1 tab(s), Oral, Daily  FLUTICASONE PROP 50 MCG SPRAY, 2 spray(s), Nasal, Daily  hydrocortisone/neomycin/polymyxin B 1%-0.35%-10,000 units/mL otic solution, 2 drop(s), QID  isosorbide MONOnitrate 60 mg oral tablet, Extended Release, 60 mg= 1 tab(s), Oral, Daily  LEVALBUTEROL 1.25 MG/3 ML SOL, 1.25 mg= 3 mL, Nasal, q6hr  multivitamin with minerals (Adult Tab), 1 tab(s), Oral, Daily  mupirocin 2% topical ointment, TOP, TID  ondansetron 4 mg oral tablet, 4 mg= 1 tab(s), Oral, q4-6hr  Prilosec 40 mg oral DR capsule, 40 mg= 1 cap(s), Oral, Daily,? ?Still taking, not as prescribed: takes bid, per pt & list  propranolol 10 mg oral tablet, 10 mg= 1 tab(s), Oral, Daily  Proscar 5 mg oral tablet, 5 mg= 1 tab(s), Oral, Daily  rosuvastatin 40 mg oral tablet, 40 mg= 1 tab(s), Oral, Daily  Symbicort 160 mcg-4.5 mcg/inh inhalation aerosol, 2 puff(s), INH, BID  tamsulosin 0.4 mg oral capsule, 0.4 mg= 1 cap(s), Oral, Daily  tetanus/diphth/pertuss (Tdap) adult/adol, 0.5 mL, IM, Once  traMADol 50 mg oral tablet, 50 mg= 1 tab(s), Oral, q6hr  umeclidinium 62.5 mcg/inh inhalation powder, 1 EA, INH, q24hr  Vitamin D3 2000 intl units oral tablet, 4000 IntUnit= 2 tab(s), Oral, Daily  Allergies  No Known Allergies  Social History  Abuse/Neglect  No, No, Yes, 12/03/2020  Alcohol  Past, 01/18/2017  Employment/School  Retired, 11/18/2020  Retired, 07/05/2017  Home/Environment  Lives with Children., 11/18/2020  Lives with Children., 07/05/2017  Nutrition/Health  Regular, 11/18/2020  Regular, 07/05/2017  Substance Use  Never,  01/18/2017  Tobacco  10 or more cigarettes (1/2 pack or more)/day in last 30 days, Cigarettes, N/A, 12/03/2020  Family History  COPD (chronic obstructive pulmonary disease).: Mother.  Health Maintenance  Health Maintenance  ???Pending?(in the next year)  ??? ??OverDue  ??? ? ? ?Aspirin Therapy for CVD Prevention due??04/04/18??and every 1??year(s)  ??? ? ? ?COPD Management-COPD Medications Prescribed due??07/05/18??and every 1??year(s)  ??? ? ? ?Cognitive Screening due??01/02/20??and every 1??year(s)  ??? ? ? ?Influenza Vaccine due??10/01/20??and every 1??day(s)  ??? ??Due?  ??? ? ? ?ADL Screening due??12/03/20??and every 1??year(s)  ??? ? ? ?COPD Maintenance-Pulmonary Rehab Education due??12/03/20??and every 1??year(s)  ??? ? ? ?Hypertension Management-Education due??12/03/20??and every 1??year(s)  ??? ? ? ?Medicare Annual Wellness Exam due??12/03/20??and every 1??year(s)  ??? ? ? ?Pneumococcal Vaccine due??12/03/20??Unknown Frequency  ??? ? ? ?Tetanus Vaccine due??12/03/20??and every 10??year(s)  ??? ? ? ?Zoster Vaccine due??12/03/20??Unknown Frequency  ??? ??Due In Future?  ??? ? ? ?Obesity Screening not due until??01/01/21??and every 1??year(s)  ??? ? ? ?Smoking Cessation not due until??01/01/21??and every 1??year(s)  ??? ? ? ?Advance Directive not due until??01/02/21??and every 1??year(s)  ??? ? ? ?Fall Risk Assessment not due until??01/02/21??and every 1??year(s)  ??? ? ? ?Functional Assessment not due until??01/02/21??and every 1??year(s)  ??? ? ? ?Depression Screening not due until??11/12/21??and every 1??year(s)  ??? ? ? ?Hypertension Management-BMP not due until??11/16/21??and every 1??year(s)  ??? ? ? ?Coronary Artery Disease Maintenance-BMP not due until??11/16/21??and every 1??year(s)  ??? ? ? ?Coronary Artery Disease Maintenance-Electrocardiogram not due until??11/18/21??and every 1??year(s)  ??? ? ? ?COPD Management-Oxygen Assessment not due until??11/18/21??and every 1??year(s)  ???Satisfied?(in the  past 1 year)  ??? ??Satisfied?  ??? ? ? ?Advance Directive on??11/18/20.??Satisfied by Ashley Romero RN  ??? ? ? ?Blood Pressure Screening on??12/03/20.??Satisfied by Sara Somers  ??? ? ? ?Body Mass Index Check on??12/03/20.??Satisfied by Sara Somers  ??? ? ? ?COPD Management-Oxygen Assessment on??11/18/20.??Satisfied by Lolita Meza RN  ??? ? ? ?Coronary Artery Disease Maintenance-Electrocardiogram on??11/18/20.??Satisfied by Zina Matthew RN  ??? ? ? ?Coronary Artery Disease Maintenance-BMP on??11/16/20.??Satisfied by Leslie Cm  ??? ? ? ?Coronary Artery Disease Maintenance-Lipid Lowering Therapy on??11/13/20.  ??? ? ? ?Depression Screening on??11/12/20.??Satisfied by Shay Miller  ??? ? ? ?Diabetes Screening on??11/16/20.??Satisfied by Leslie Cm  ??? ? ? ?Fall Risk Assessment on??12/03/20.??Satisfied by Sara Somers  ??? ? ? ?Functional Assessment on??11/18/20.??Satisfied by Ashley Romero RN  ??? ? ? ?Hypertension Management-Blood Pressure on??12/03/20.??Satisfied by Sara Somers  ??? ? ? ?Hypertension Management-BMP on??11/16/20.??Satisfied by Leslie Cm  ??? ? ? ?Lipid Screening on??03/10/20.??Satisfied by Leslie Cm  ??? ? ? ?Obesity Screening on??12/03/20.??Satisfied by Sara Somers  ??? ? ? ?Smoking Cessation on??11/12/20.??Satisfied by Shay Miller  ?  Diagnostic Results  AP lateral right wrist reviewed. ?Patients fracture alignment is unchanged with very early callus from

## 2022-05-03 NOTE — HISTORICAL [facility]
This is a historical note converted from Adam. Formatting and pictures may have been removed.  Please reference Adam for original formatting and attached multimedia. History of Present Illness  75-year-old male presents here follow-up of right distal radius fracture. ?Patient is doing fairly well. ?Is about 3 weeks out. ?His sutures have been removed. ?Has no complaints of pain.  Review of Systems  Denies fevers, chills, chest pain, shortness of breath. Comprehensive review of systems performed and otherwise negative except as noted in HPI.  Physical Exam  Vitals & Measurements  T:?36.8? ?C (Temporal Artery)? HR:?74(Peripheral)? BP:?129/74?  HT:?165.10?cm? WT:?64.100?kg? BMI:?23.52?  General: No acute distress, alert and oriented, healthy appearing?  HEENT: Head is atraumatic, mucous membranes are moist?  Cardiovascular: Brisk capillary refill  Lungs: Breathing non-labored?  Skin: no rashes appreciated?  Neurologic: Sensation is grossly intact distally  ?  Right wrist:  Patient has full range of motion of his fingers. ?His wrist is stiff although this is chronic in nature. ?He has minimal?pronation or supination which again is back to his baseline. ?His incision is well-healed.  Assessment/Plan  1.?Fracture of distal end of right radius and ulna?S52.501A  ?Patients fracture alignment is unchanged. ?It is healing well. ?At this point he is doing fairly well.? We will plan to continue to make him nonweightbearing to the right upper extremity. ?We will see him back in a month and plan to advance his weightbearing status at that point.? Continue brace at all times except for hygiene.  Ordered:  Clinic Follow up, *Est. 02/09/21 16:00:00 CST, Order for future visit, Fracture of distal end of right radius and ulna, Orthopaedics  Post-Op follow-up visit [id number] PC, Fracture of distal end of right radius and ulna, [facility], 01/12/21 16:46:00 CST  XR Wrist Right Minimum 3 Views, Routine, *Est. 02/09/21  3:00:00 CST, None, Ambulatory, Patient Has IV?, Rad Type, Order for future visit, Fracture of distal end of right radius and ulna, Not Scheduled, *Est. 02/09/21 3:00:00 CST  ?  Referrals  Clinic Follow up, *Est. 02/09/21 16:00:00 CST, Order for future visit, Fracture of distal end of right radius and ulna, [facility]   Problem List/Past Medical History  Ongoing  CAD - Coronary artery disease  COPD - Chronic obstructive pulmonary disease  Emphysema of lung  Fracture of distal end of right radius and ulna  GERD - Gastro-esophageal reflux disease  High cholesterol  Hypertension  Irregular heart beat  Neuropathy  Parkinson disease  Tobacco user  Historical  COPD (chronic obstructive pulmonary disease)  Depressed  Hyperlipemia  Parkinson disease  Spinal stenosis  Procedure/Surgical History  Open treatment of distal radial extra-articular fracture or epiphyseal separation, with internal fixation (11/18/2020)  ORIF Forearm (Right) (11/18/2020)  Reposition Right Radius with Internal Fixation Device, Open Approach (11/18/2020)  Application of short arm splint (forearm to hand); static (11/05/2020)  Immobilization of Right Lower Arm using Splint (11/05/2020)  Repair Face Skin, External Approach (11/05/2020)  Simple repair of superficial wounds of face, ears, eyelids, nose, lips and/or mucous membranes; 2.5 cm or less (11/05/2020)  Catheter placement in coronary artery(s) for coronary angiography, including intraprocedural injection(s) for coronary angiography, imaging supervision and interpretation; with left heart catheterization including intraprocedural injection(s) for left lilli (07/06/2017)  Fluoroscopy of Left Heart using Low Osmolar Contrast (07/06/2017)  Fluoroscopy of Multiple Coronary Arteries using Low Osmolar Contrast (07/06/2017)  Measurement of Cardiac Sampling and Pressure, Left Heart, Percutaneous Approach (07/06/2017)  neck surgery  removal cyst in jaw  Repair of rotator cuff of shoulder  Tonsillectomy    Medications  acetaminophen-hydrocodone 325 mg-5 mg oral tablet, 1 tab(s), Oral, q4hr  alPRAzolam 0.25 mg oral tab, 0.25 mg= 1 tab(s), Oral, BID  Antioxidant Formula oral capsule, 1 cap(s), Oral, Daily,? ?Not taking: not on list  aspirin 81 mg oral tablet, 81 mg= 1 tab(s), Oral, Daily  carbidopa-levodopa 25 mg-100 mg oral tablet, See Instructions  Combivent Respimat CFC free 20 mcg-100 mcg/inh inhalation aerosol, 1 puff(s), INH, QID  ESCITALOPRAM 20 MG TABLET, 20 mg= 1 tab(s), Oral, Daily  ezetimibe 10 mg oral tablet, 10 mg= 1 tab(s), Oral, Daily  FLUTICASONE PROP 50 MCG SPRAY, 2 spray(s), Nasal, Daily  hydrocortisone/neomycin/polymyxin B 1%-0.35%-10,000 units/mL otic solution, 2 drop(s), QID  isosorbide MONOnitrate 60 mg oral tablet, Extended Release, 60 mg= 1 tab(s), Oral, Daily  LEVALBUTEROL 1.25 MG/3 ML SOL, 1.25 mg= 3 mL, Nasal, q6hr  multivitamin with minerals (Adult Tab), 1 tab(s), Oral, Daily  mupirocin 2% topical ointment, TOP, TID  ondansetron 4 mg oral tablet, 4 mg= 1 tab(s), Oral, q4-6hr  Prilosec 40 mg oral DR capsule, 40 mg= 1 cap(s), Oral, Daily,? ?Still taking, not as prescribed: takes bid, per pt & list  propranolol 10 mg oral tablet, 10 mg= 1 tab(s), Oral, Daily  Proscar 5 mg oral tablet, 5 mg= 1 tab(s), Oral, Daily  rosuvastatin 40 mg oral tablet, 40 mg= 1 tab(s), Oral, Daily  Symbicort 160 mcg-4.5 mcg/inh inhalation aerosol, 2 puff(s), INH, BID  tamsulosin 0.4 mg oral capsule, 0.4 mg= 1 cap(s), Oral, Daily  tetanus/diphth/pertuss (Tdap) adult/adol, 0.5 mL, IM, Once  traMADol 50 mg oral tablet, 50 mg= 1 tab(s), Oral, q6hr  umeclidinium 62.5 mcg/inh inhalation powder, 1 EA, INH, q24hr  Vitamin D3 2000 intl units oral tablet, 4000 IntUnit= 2 tab(s), Oral, Daily  Allergies  No Known Allergies  Social History  Abuse/Neglect  No, No, Yes, 01/12/2021  Alcohol  Past, 01/12/2021  Employment/School  Retired, 11/18/2020  Retired, 07/05/2017  Home/Environment  Lives with Children., 11/18/2020  Lives with  Children., 07/05/2017  Nutrition/Health  Regular, 11/18/2020  Regular, 07/05/2017  Substance Use  Never, 01/18/2017  Tobacco  10 or more cigarettes (1/2 pack or more)/day in last 30 days, Cigarettes, N/A, 01/12/2021  Family History  COPD (chronic obstructive pulmonary disease).: Mother.  Health Maintenance  Health Maintenance  ???Pending?(in the next year)  ??? ??OverDue  ??? ? ? ?Aspirin Therapy for CVD Prevention due??04/04/18??and every 1??year(s)  ??? ? ? ?COPD Management-COPD Medications Prescribed due??07/05/18??and every 1??year(s)  ??? ? ? ?Influenza Vaccine due??10/01/20??and every 1??day(s)  ??? ? ? ?Advance Directive due??01/02/21??and every 1??year(s)  ??? ? ? ?Cognitive Screening due??01/02/21??and every 1??year(s)  ??? ? ? ?Functional Assessment due??01/02/21??and every 1??year(s)  ??? ??Due?  ??? ? ? ?ADL Screening due??01/12/21??and every 1??year(s)  ??? ? ? ?COPD Maintenance-Pulmonary Rehab Education due??01/12/21??and every 1??year(s)  ??? ? ? ?Hypertension Management-Education due??01/12/21??and every 1??year(s)  ??? ? ? ?Medicare Annual Wellness Exam due??01/12/21??and every 1??year(s)  ??? ? ? ?Pneumococcal Vaccine due??01/12/21??Unknown Frequency  ??? ? ? ?Tetanus Vaccine due??01/12/21??and every 10??year(s)  ??? ? ? ?Zoster Vaccine due??01/12/21??Unknown Frequency  ??? ??Due In Future?  ??? ? ? ?Depression Screening not due until??11/12/21??and every 1??year(s)  ??? ? ? ?Hypertension Management-BMP not due until??11/16/21??and every 1??year(s)  ??? ? ? ?Coronary Artery Disease Maintenance-BMP not due until??11/16/21??and every 1??year(s)  ??? ? ? ?Coronary Artery Disease Maintenance-Electrocardiogram not due until??11/18/21??and every 1??year(s)  ??? ? ? ?COPD Management-Oxygen Assessment not due until??11/18/21??and every 1??year(s)  ??? ? ? ?Obesity Screening not due until??01/01/22??and every 1??year(s)  ??? ? ? ?Smoking Cessation not due until??01/01/22??and every 1??year(s)  ??? ? ? ?Fall  Risk Assessment not due until??01/02/22??and every 1??year(s)  ???Satisfied?(in the past 1 year)  ??? ??Satisfied?  ??? ? ? ?Advance Directive on??11/18/20.??Satisfied by Ashley Romero RN.  ??? ? ? ?Blood Pressure Screening on??01/12/21.??Satisfied by Janneth Villarreal  ??? ? ? ?Body Mass Index Check on??01/12/21.??Satisfied by Janneth Villarreal.  ??? ? ? ?COPD Management-Oxygen Assessment on??11/18/20.??Satisfied by Susana CLEARY, Lolita SCOTT  ??? ? ? ?Coronary Artery Disease Maintenance-Electrocardiogram on??11/18/20.??Satisfied by Zina Matthew RN  ??? ? ? ?Coronary Artery Disease Maintenance-BMP on??11/16/20.??Satisfied by Leslie Cm  ??? ? ? ?Coronary Artery Disease Maintenance-Lipid Lowering Therapy on??11/13/20.  ??? ? ? ?Depression Screening on??11/12/20.??Satisfied by Shay Miller  ??? ? ? ?Diabetes Screening on??11/16/20.??Satisfied by Leslie Cm  ??? ? ? ?Fall Risk Assessment on??01/12/21.??Satisfied by Janneth Villarreal.  ??? ? ? ?Functional Assessment on??11/18/20.??Satisfied by Ashley Romero RN  ??? ? ? ?Hypertension Management-Blood Pressure on??01/12/21.??Satisfied by Janneth Villarreal  ??? ? ? ?Hypertension Management-BMP on??11/16/20.??Satisfied by Leslie Cm  ??? ? ? ?Lipid Screening on??03/10/20.??Satisfied by Leslie Cm  ??? ? ? ?Obesity Screening on??01/12/21.??Satisfied by Janneth Villarreal  ??? ? ? ?Smoking Cessation on??01/12/21.??Satisfied by Janneth Villarreal.  ?  Diagnostic Results  AP lateral right wrist reviewed. ?Patients fracture alignment is unchanged?with adequate reduction. ?Hardware in good position.

## 2022-05-03 NOTE — HISTORICAL [facility]
This is a historical note converted from Adam. Formatting and pictures may have been removed.  Please reference Adam for original formatting and attached multimedia. Chief Complaint  1 Mnth F/U ORIF Rt wrist on 11/18/20  History of Present Illness  75-year-old man presents here follow-up of depression fixation of right distal radius. ?Overall patient is doing fairly well. ?Walking with a cane. ?Using his brace in the right wrist. ?Without complaints today.  Review of Systems  Denies fevers, chills, chest pain, shortness of breath. Comprehensive review of systems performed and otherwise negative except as noted in HPI.  Physical Exam  Vitals & Measurements  T:?96.7? ?F (Oral)? HR:?81(Peripheral)? BP:?130/70?  HT:?165.10?cm? WT:?64.100?kg? BMI:?23.52?  General: No acute distress, alert and oriented, healthy appearing?  HEENT: Head is atraumatic, mucous membranes are moist?  Cardiovascular: Brisk capillary refill  Lungs: Breathing non-labored?  Skin: no rashes appreciated?  Neurologic: Sensation is grossly intact distally  ?  Right wrist:  Brisk cap refill distally. ?Patient has 20 degrees of dorsiflexion. ?20s, palmar flexion. ?Full range of motion of his fingers. ?Incision is healed well. ?No swelling noted.  Assessment/Plan  1.?Fracture of distal end of right radius and ulna?S52.806A  ?Patients fracture is gone on to union. ?He has having no issues. ?He is happy with his recovery.? I discussed with the patient should he have any pain or swelling in the future, to contact us.? Otherwise he is happy to recovery. ?We will plan to see him back on as-needed basis.   Problem List/Past Medical History  Ongoing  CAD - Coronary artery disease  COPD - Chronic obstructive pulmonary disease  Emphysema of lung  Fracture of distal end of right radius and ulna  GERD - Gastro-esophageal reflux disease  High cholesterol  Hypertension  Irregular heart beat  Neuropathy  Parkinson disease  Tobacco user  Historical  COPD (chronic  obstructive pulmonary disease)  Depressed  Hyperlipemia  Parkinson disease  Spinal stenosis  Procedure/Surgical History  Open treatment of distal radial extra-articular fracture or epiphyseal separation, with internal fixation (11/18/2020)  ORIF Forearm (Right) (11/18/2020)  Reposition Right Radius with Internal Fixation Device, Open Approach (11/18/2020)  Application of short arm splint (forearm to hand); static (11/05/2020)  Immobilization of Right Lower Arm using Splint (11/05/2020)  Repair Face Skin, External Approach (11/05/2020)  Simple repair of superficial wounds of face, ears, eyelids, nose, lips and/or mucous membranes; 2.5 cm or less (11/05/2020)  Catheter placement in coronary artery(s) for coronary angiography, including intraprocedural injection(s) for coronary angiography, imaging supervision and interpretation; with left heart catheterization including intraprocedural injection(s) for left lilli (07/06/2017)  Fluoroscopy of Left Heart using Low Osmolar Contrast (07/06/2017)  Fluoroscopy of Multiple Coronary Arteries using Low Osmolar Contrast (07/06/2017)  Measurement of Cardiac Sampling and Pressure, Left Heart, Percutaneous Approach (07/06/2017)  neck surgery  removal cyst in jaw  Repair of rotator cuff of shoulder  Tonsillectomy   Medications  acetaminophen-hydrocodone 325 mg-5 mg oral tablet, 1 tab(s), Oral, q4hr  alPRAzolam 0.25 mg oral tab, 0.25 mg= 1 tab(s), Oral, BID  Antioxidant Formula oral capsule, 1 cap(s), Oral, Daily,? ?Not taking: not on list  aspirin 81 mg oral tablet, 81 mg= 1 tab(s), Oral, Daily  carbidopa-levodopa 25 mg-100 mg oral tablet, See Instructions  Combivent Respimat CFC free 20 mcg-100 mcg/inh inhalation aerosol, 1 puff(s), INH, QID  ESCITALOPRAM 20 MG TABLET, 20 mg= 1 tab(s), Oral, Daily  ezetimibe 10 mg oral tablet, 10 mg= 1 tab(s), Oral, Daily  FLUTICASONE PROP 50 MCG SPRAY, 2 spray(s), Nasal, Daily  hydrocortisone/neomycin/polymyxin B 1%-0.35%-10,000 units/mL otic  solution, 2 drop(s), QID  isosorbide MONOnitrate 60 mg oral tablet, Extended Release, 60 mg= 1 tab(s), Oral, Daily  LEVALBUTEROL 1.25 MG/3 ML SOL, 1.25 mg= 3 mL, Nasal, q6hr  multivitamin with minerals (Adult Tab), 1 tab(s), Oral, Daily  mupirocin 2% topical ointment, TOP, TID  ondansetron 4 mg oral tablet, 4 mg= 1 tab(s), Oral, q4-6hr  Prilosec 40 mg oral DR capsule, 40 mg= 1 cap(s), Oral, Daily,? ?Still taking, not as prescribed: takes bid, per pt & list  propranolol 10 mg oral tablet, 10 mg= 1 tab(s), Oral, Daily  Proscar 5 mg oral tablet, 5 mg= 1 tab(s), Oral, Daily  rosuvastatin 40 mg oral tablet, 40 mg= 1 tab(s), Oral, Daily  Symbicort 160 mcg-4.5 mcg/inh inhalation aerosol, 2 puff(s), INH, BID  tamsulosin 0.4 mg oral capsule, 0.4 mg= 1 cap(s), Oral, Daily  tetanus/diphth/pertuss (Tdap) adult/adol, 0.5 mL, IM, Once  traMADol 50 mg oral tablet, 50 mg= 1 tab(s), Oral, q6hr  umeclidinium 62.5 mcg/inh inhalation powder, 1 EA, INH, q24hr  Vitamin D3 2000 intl units oral tablet, 4000 IntUnit= 2 tab(s), Oral, Daily  Allergies  No Known Allergies  Social History  Abuse/Neglect  No, No, Yes, 03/11/2021  Alcohol  Past, 01/12/2021  Employment/School  Retired, 11/18/2020  Retired, 07/05/2017  Home/Environment  Lives with Children., 11/18/2020  Lives with Children., 07/05/2017  Nutrition/Health  Regular, 11/18/2020  Regular, 07/05/2017  Substance Use  Never, 01/18/2017  Tobacco  10 or more cigarettes (1/2 pack or more)/day in last 30 days, Cigarettes, N/A, 03/11/2021  Family History  COPD (chronic obstructive pulmonary disease).: Mother.  Health Maintenance  Health Maintenance  ???Pending?(in the next year)  ??? ??OverDue  ??? ? ? ?Aspirin Therapy for CVD Prevention due??04/04/18??and every 1??year(s)  ??? ? ? ?COPD Management-COPD Medications Prescribed due??07/05/18??and every 1??year(s)  ??? ? ? ?Influenza Vaccine due??10/01/20??and every 1??day(s)  ??? ? ? ?Advance Directive due??01/02/21??and every 1??year(s)  ??? ? ?  ?Cognitive Screening due??01/02/21??and every 1??year(s)  ??? ??Due?  ??? ? ? ?ADL Screening due??03/11/21??and every 1??year(s)  ??? ? ? ?COPD Maintenance-Pulmonary Rehab Education due??03/11/21??and every 1??year(s)  ??? ? ? ?Hypertension Management-Education due??03/11/21??and every 1??year(s)  ??? ? ? ?Medicare Annual Wellness Exam due??03/11/21??and every 1??year(s)  ??? ? ? ?Pneumococcal Vaccine due??03/11/21??Unknown Frequency  ??? ? ? ?Tetanus Vaccine due??03/11/21??and every 10??year(s)  ??? ? ? ?Zoster Vaccine due??03/11/21??Unknown Frequency  ??? ??Due In Future?  ??? ? ? ?Hypertension Management-BMP not due until??11/16/21??and every 1??year(s)  ??? ? ? ?Coronary Artery Disease Maintenance-BMP not due until??11/16/21??and every 1??year(s)  ??? ? ? ?Coronary Artery Disease Maintenance-Electrocardiogram not due until??11/18/21??and every 1??year(s)  ??? ? ? ?COPD Management-Oxygen Assessment not due until??11/18/21??and every 1??year(s)  ??? ? ? ?Obesity Screening not due until??01/01/22??and every 1??year(s)  ??? ? ? ?Smoking Cessation not due until??01/01/22??and every 1??year(s)  ??? ? ? ?Fall Risk Assessment not due until??01/02/22??and every 1??year(s)  ??? ? ? ?Functional Assessment not due until??01/02/22??and every 1??year(s)  ???Satisfied?(in the past 1 year)  ??? ??Satisfied?  ??? ? ? ?Advance Directive on??11/18/20.??Satisfied by Rhymes RN, Ashley HE  ??? ? ? ?Blood Pressure Screening on??03/11/21.??Satisfied by Veronica Salinas  ??? ? ? ?Body Mass Index Check on??03/11/21.??Satisfied by Veronica Salinas  ??? ? ? ?COPD Management-Oxygen Assessment on??11/18/20.??Satisfied by Susana CLEARY, Lolita SCOTT  ??? ? ? ?Coronary Artery Disease Maintenance-Electrocardiogram on??11/18/20.??Satisfied by Zina Matthew RN  ??? ? ? ?Coronary Artery Disease Maintenance-BMP on??11/16/20.??Satisfied by Leslie Cm  ??? ? ? ?Coronary Artery Disease Maintenance-Lipid Lowering Therapy on??11/13/20.  ??? ? ?  ?Depression Screening on??03/11/21.??Satisfied by Veronica Salinas  ??? ? ? ?Diabetes Screening on??11/16/20.??Satisfied by Leslie Cm  ??? ? ? ?Fall Risk Assessment on??03/11/21.??Satisfied by Veronica Salinas  ??? ? ? ?Functional Assessment on??03/11/21.??Satisfied by Veronica Salinas  ??? ? ? ?Hypertension Management-Blood Pressure on??03/11/21.??Satisfied by Veronica Salinas  ??? ? ? ?Hypertension Management-BMP on??11/16/20.??Satisfied by Leslie Cm  ??? ? ? ?Obesity Screening on??03/11/21.??Satisfied by Veronica Salinas  ??? ? ? ?Smoking Cessation on??01/12/21.??Satisfied by Janneth Villarreal  ?  Diagnostic Results  AP lateral right wrist reviewed. ?Patients implant position is unchanged.? His fracture has gone on to union.? Radiocarpal joint?has not narrowed.

## 2023-03-07 DIAGNOSIS — G20.A1 PARKINSON'S DISEASE: Primary | ICD-10-CM
